# Patient Record
Sex: MALE | Race: WHITE | Employment: UNEMPLOYED | URBAN - METROPOLITAN AREA
[De-identification: names, ages, dates, MRNs, and addresses within clinical notes are randomized per-mention and may not be internally consistent; named-entity substitution may affect disease eponyms.]

---

## 2021-07-10 ENCOUNTER — APPOINTMENT (OUTPATIENT)
Dept: GENERAL RADIOLOGY | Age: 36
End: 2021-07-10
Attending: PHYSICIAN ASSISTANT
Payer: COMMERCIAL

## 2021-07-10 ENCOUNTER — HOSPITAL ENCOUNTER (EMERGENCY)
Age: 36
Discharge: HOME | End: 2021-07-10
Attending: EMERGENCY MEDICINE
Payer: COMMERCIAL

## 2021-07-10 VITALS
TEMPERATURE: 98.5 F | SYSTOLIC BLOOD PRESSURE: 144 MMHG | RESPIRATION RATE: 13 BRPM | HEIGHT: 77 IN | HEART RATE: 99 BPM | DIASTOLIC BLOOD PRESSURE: 101 MMHG | OXYGEN SATURATION: 100 % | BODY MASS INDEX: 27.75 KG/M2 | WEIGHT: 235 LBS

## 2021-07-10 DIAGNOSIS — R07.9 CHEST PAIN, UNSPECIFIED TYPE: Primary | ICD-10-CM

## 2021-07-10 DIAGNOSIS — K44.9 HIATAL HERNIA: ICD-10-CM

## 2021-07-10 LAB
ALBUMIN SERPL-MCNC: 4.1 G/DL (ref 3.5–5)
ALBUMIN/GLOB SERPL: 1.2 {RATIO} (ref 1.1–2.2)
ALP SERPL-CCNC: 82 U/L (ref 45–117)
ALT SERPL-CCNC: 49 U/L (ref 12–78)
ANION GAP SERPL CALC-SCNC: 5 MMOL/L (ref 5–15)
AST SERPL-CCNC: 24 U/L (ref 15–37)
BASOPHILS # BLD: 0 K/UL (ref 0–0.1)
BASOPHILS NFR BLD: 0 % (ref 0–1)
BILIRUB SERPL-MCNC: 0.5 MG/DL (ref 0.2–1)
BUN SERPL-MCNC: 18 MG/DL (ref 6–20)
BUN/CREAT SERPL: 19 (ref 12–20)
CALCIUM SERPL-MCNC: 9.1 MG/DL (ref 8.5–10.1)
CHLORIDE SERPL-SCNC: 108 MMOL/L (ref 97–108)
CK MB CFR SERPL CALC: NORMAL % (ref 0–2.5)
CK MB SERPL-MCNC: <1 NG/ML (ref 5–25)
CK SERPL-CCNC: 154 U/L (ref 39–308)
CO2 SERPL-SCNC: 27 MMOL/L (ref 21–32)
CREAT SERPL-MCNC: 0.95 MG/DL (ref 0.7–1.3)
D DIMER PPP FEU-MCNC: <0.19 MG/L FEU (ref 0–0.65)
DIFFERENTIAL METHOD BLD: NORMAL
EOSINOPHIL # BLD: 0.1 K/UL (ref 0–0.4)
EOSINOPHIL NFR BLD: 2 % (ref 0–7)
ERYTHROCYTE [DISTWIDTH] IN BLOOD BY AUTOMATED COUNT: 11.9 % (ref 11.5–14.5)
GLOBULIN SER CALC-MCNC: 3.5 G/DL (ref 2–4)
GLUCOSE SERPL-MCNC: 89 MG/DL (ref 65–100)
HCT VFR BLD AUTO: 42.9 % (ref 36.6–50.3)
HGB BLD-MCNC: 14.8 G/DL (ref 12.1–17)
IMM GRANULOCYTES # BLD AUTO: 0 K/UL (ref 0–0.04)
IMM GRANULOCYTES NFR BLD AUTO: 0 % (ref 0–0.5)
LYMPHOCYTES # BLD: 1.5 K/UL (ref 0.8–3.5)
LYMPHOCYTES NFR BLD: 20 % (ref 12–49)
MCH RBC QN AUTO: 29.8 PG (ref 26–34)
MCHC RBC AUTO-ENTMCNC: 34.5 G/DL (ref 30–36.5)
MCV RBC AUTO: 86.5 FL (ref 80–99)
MONOCYTES # BLD: 0.4 K/UL (ref 0–1)
MONOCYTES NFR BLD: 5 % (ref 5–13)
NEUTS SEG # BLD: 5.4 K/UL (ref 1.8–8)
NEUTS SEG NFR BLD: 73 % (ref 32–75)
NRBC # BLD: 0 K/UL (ref 0–0.01)
NRBC BLD-RTO: 0 PER 100 WBC
PLATELET # BLD AUTO: 197 K/UL (ref 150–400)
PMV BLD AUTO: 10.5 FL (ref 8.9–12.9)
POTASSIUM SERPL-SCNC: 3.8 MMOL/L (ref 3.5–5.1)
PROT SERPL-MCNC: 7.6 G/DL (ref 6.4–8.2)
RBC # BLD AUTO: 4.96 M/UL (ref 4.1–5.7)
SODIUM SERPL-SCNC: 140 MMOL/L (ref 136–145)
TROPONIN I BLD-MCNC: <0.04 NG/ML (ref 0–0.08)
WBC # BLD AUTO: 7.4 K/UL (ref 4.1–11.1)

## 2021-07-10 PROCEDURE — 74011250637 HC RX REV CODE- 250/637: Performed by: EMERGENCY MEDICINE

## 2021-07-10 PROCEDURE — 80053 COMPREHEN METABOLIC PANEL: CPT

## 2021-07-10 PROCEDURE — 74011000250 HC RX REV CODE- 250: Performed by: EMERGENCY MEDICINE

## 2021-07-10 PROCEDURE — 82550 ASSAY OF CK (CPK): CPT

## 2021-07-10 PROCEDURE — 71045 X-RAY EXAM CHEST 1 VIEW: CPT

## 2021-07-10 PROCEDURE — 36415 COLL VENOUS BLD VENIPUNCTURE: CPT

## 2021-07-10 PROCEDURE — 84484 ASSAY OF TROPONIN QUANT: CPT

## 2021-07-10 PROCEDURE — 85379 FIBRIN DEGRADATION QUANT: CPT

## 2021-07-10 PROCEDURE — 85025 COMPLETE CBC W/AUTO DIFF WBC: CPT

## 2021-07-10 PROCEDURE — 99284 EMERGENCY DEPT VISIT MOD MDM: CPT

## 2021-07-10 PROCEDURE — 93005 ELECTROCARDIOGRAM TRACING: CPT

## 2021-07-10 RX ORDER — SUCRALFATE 1 G/1
1 TABLET ORAL 3 TIMES DAILY
Qty: 30 TABLET | Refills: 0 | Status: SHIPPED | OUTPATIENT
Start: 2021-07-10

## 2021-07-10 RX ADMIN — ALUMINUM HYDROXIDE AND MAGNESIUM HYDROXIDE 40 ML: 200; 200 SUSPENSION ORAL at 21:22

## 2021-07-11 LAB
ATRIAL RATE: 86 BPM
CALCULATED P AXIS, ECG09: 58 DEGREES
CALCULATED R AXIS, ECG10: 86 DEGREES
CALCULATED T AXIS, ECG11: 47 DEGREES
DIAGNOSIS, 93000: NORMAL
P-R INTERVAL, ECG05: 176 MS
Q-T INTERVAL, ECG07: 366 MS
QRS DURATION, ECG06: 102 MS
QTC CALCULATION (BEZET), ECG08: 437 MS
VENTRICULAR RATE, ECG03: 86 BPM

## 2021-07-11 NOTE — ED NOTES
Report received from [facility]. They advised of the patient's chief complaint, current status, orders completed (to include IV access/medications/radiology testing), outstanding orders that still need to be completed, and the treatment plan. Questions asked and answered prior to assumption of care.

## 2021-07-11 NOTE — ED PROVIDER NOTES
EMERGENCY DEPARTMENT HISTORY AND PHYSICAL EXAM      Date: 7/10/2021  Patient Name: Priyanka Segovia    Please note that this dictation was completed with [employer], the computer voice recognition software. Quite often unanticipated grammatical, syntax, homophones, and other interpretive errors are inadvertently transcribed by the computer software. Please disregard these errors. Please excuse any errors that have escaped final proofreading. History of Presenting Illness     Chief Complaint   Patient presents with    Chest Pain     Ambulatory into the ED accompanied by EMS - driving 6 hours to [place] when CP started. Took 324 mg ASA pta. No obvious distress noted. History Provided By: Patient     HPI: Priyanka Segovia, 39 y.o. male, presenting the emergency department complaining of chest pain. Patient reports sudden onset of chest pain while driving. Worse with certain movements, associated with some shortness of breath. No radiation, nothing makes it better or worse. He went to a pharmacy and asked the pharmacist what he should do and they called 911. He had 324 of aspirin prior to arrival.  Patient reports feeling better at this time. No prior history of coronary artery disease, no other medical problems. Nothing makes his symptoms better or worse. PCP: None    No current facility-administered medications on file prior to encounter. No current outpatient medications on file prior to encounter. Past History     Past Medical History:  No past medical history on file. Past Surgical History:  No past surgical history on file. Family History:  No family history on file. Social History:  Social History     Tobacco Use    Smoking status: Not on file   Substance Use Topics    Alcohol use: Not on file    Drug use: Not on file       Allergies:  No Known Allergies      Review of Systems   Review of Systems   Constitutional: Negative for chills and fever.    HENT: Negative for congestion and sore throat. Eyes: Negative for visual disturbance. Respiratory: Negative for cough and shortness of breath. Cardiovascular: Positive for chest pain. Negative for leg swelling. Gastrointestinal: Negative for abdominal pain, blood in stool, diarrhea and nausea. Endocrine: Negative for polyuria. Genitourinary: Negative for dysuria and testicular pain. Musculoskeletal: Negative for arthralgias, joint swelling and myalgias. Skin: Negative for rash. Allergic/Immunologic: Negative for immunocompromised state. Neurological: Negative for weakness and headaches. Hematological: Does not bruise/bleed easily. Psychiatric/Behavioral: Negative for confusion. Physical Exam   Physical Exam  Vitals and nursing note reviewed. Constitutional:       Appearance: He is well-developed. HENT:      Head: Normocephalic and atraumatic. Eyes:      General:         Right eye: No discharge. Left eye: No discharge. Conjunctiva/sclera: Conjunctivae normal.      Pupils: Pupils are equal, round, and reactive to light. Neck:      Trachea: No tracheal deviation. Cardiovascular:      Rate and Rhythm: Normal rate and regular rhythm. Heart sounds: Normal heart sounds. No murmur heard. Pulmonary:      Effort: Pulmonary effort is normal. No respiratory distress. Breath sounds: Normal breath sounds. No wheezing or rales. Abdominal:      General: Bowel sounds are normal.      Palpations: Abdomen is soft. Tenderness: There is no abdominal tenderness. There is no guarding or rebound. Musculoskeletal:         General: No tenderness or deformity. Normal range of motion. Cervical back: Normal range of motion and neck supple. Skin:     General: Skin is warm and dry. Findings: No erythema or rash. Neurological:      Mental Status: He is alert and oriented to person, place, and time.    Psychiatric:         Behavior: Behavior normal.         Diagnostic Study Results     Labs -     Recent Results (from the past 12 hour(s))   EKG, 12 LEAD, INITIAL    Collection Time: 07/10/21  5:01 PM   Result Value Ref Range    Ventricular Rate 86 BPM    Atrial Rate 86 BPM    P-R Interval 176 ms    QRS Duration 102 ms    Q-T Interval 366 ms    QTC Calculation (Bezet) 437 ms    Calculated P Axis 58 degrees    Calculated R Axis 86 degrees    Calculated T Axis 47 degrees    Diagnosis       Normal sinus rhythm  Normal ECG  No previous ECGs available     CBC WITH AUTOMATED DIFF    Collection Time: 07/10/21  6:08 PM   Result Value Ref Range    WBC 7.4 4.1 - 11.1 K/uL    RBC 4.96 4.10 - 5.70 M/uL    HGB 14.8 12.1 - 17.0 g/dL    HCT 42.9 36.6 - 50.3 %    MCV 86.5 80.0 - 99.0 FL    MCH 29.8 26.0 - 34.0 PG    MCHC 34.5 30.0 - 36.5 g/dL    RDW 11.9 11.5 - 14.5 %    PLATELET 239 457 - 929 K/uL    MPV 10.5 8.9 - 12.9 FL    NRBC 0.0 0  WBC    ABSOLUTE NRBC 0.00 0.00 - 0.01 K/uL    NEUTROPHILS 73 32 - 75 %    LYMPHOCYTES 20 12 - 49 %    MONOCYTES 5 5 - 13 %    EOSINOPHILS 2 0 - 7 %    BASOPHILS 0 0 - 1 %    IMMATURE GRANULOCYTES 0 0.0 - 0.5 %    ABS. NEUTROPHILS 5.4 1.8 - 8.0 K/UL    ABS. LYMPHOCYTES 1.5 0.8 - 3.5 K/UL    ABS. MONOCYTES 0.4 0.0 - 1.0 K/UL    ABS. EOSINOPHILS 0.1 0.0 - 0.4 K/UL    ABS. BASOPHILS 0.0 0.0 - 0.1 K/UL    ABS. IMM. GRANS. 0.0 0.00 - 0.04 K/UL    DF AUTOMATED     METABOLIC PANEL, COMPREHENSIVE    Collection Time: 07/10/21  6:08 PM   Result Value Ref Range    Sodium 140 136 - 145 mmol/L    Potassium 3.8 3.5 - 5.1 mmol/L    Chloride 108 97 - 108 mmol/L    CO2 27 21 - 32 mmol/L    Anion gap 5 5 - 15 mmol/L    Glucose 89 65 - 100 mg/dL    BUN 18 6 - 20 MG/DL    Creatinine 0.95 0.70 - 1.30 MG/DL    BUN/Creatinine ratio 19 12 - 20      GFR est AA >60 >60 ml/min/1.73m2    GFR est non-AA >60 >60 ml/min/1.73m2    Calcium 9.1 8.5 - 10.1 MG/DL    Bilirubin, total 0.5 0.2 - 1.0 MG/DL    ALT (SGPT) 49 12 - 78 U/L    AST (SGOT) 24 15 - 37 U/L    Alk.  phosphatase 82 45 - 117 U/L Protein, total 7.6 6.4 - 8.2 g/dL    Albumin 4.1 3.5 - 5.0 g/dL    Globulin 3.5 2.0 - 4.0 g/dL    A-G Ratio 1.2 1.1 - 2.2     D DIMER    Collection Time: 07/10/21  6:08 PM   Result Value Ref Range    D-dimer <0.19 0.00 - 0.65 mg/L FEU   CK W/ CKMB & INDEX    Collection Time: 07/10/21  6:08 PM   Result Value Ref Range    CK - MB <1.0 <3.6 NG/ML    CK-MB Index Cannot be calculated 0.0 - 2.5       39 - 308 U/L   POC TROPONIN-I    Collection Time: 07/10/21  9:16 PM   Result Value Ref Range    Troponin-I (POC) <0.04 0.00 - 0.08 ng/mL       Radiologic Studies -   XR CHEST SNGL V   Final Result   No acute findings. Hiatal hernia. CT Results  (Last 48 hours)    None        CXR Results  (Last 48 hours)               07/10/21 1803  XR CHEST SNGL V Final result    Impression:  No acute findings. Hiatal hernia. Narrative:  EXAM: XR CHEST SNGL V       INDICATION: Chest Pain       COMPARISON: None. FINDINGS: A portable AP radiograph of the chest was obtained at 17:44 hours. The   patient is on a cardiac monitor. There is a small to moderate hiatal hernia. The   lungs are clear. The cardiac and mediastinal contours and pulmonary vascularity   are normal.  The bones and soft tissues are grossly within normal limits. Medical Decision Making   I am the first provider for this patient. I reviewed the vital signs, available nursing notes, past medical history, past surgical history, family history and social history. Vital Signs-Reviewed the patient's vital signs. Patient Vitals for the past 12 hrs:   Temp Pulse Resp BP SpO2   07/10/21 1658 98.5 °F (36.9 °C) 99 13 (!) 144/101 100 %       EKG interpretation: (Preliminary)  EKG shows sinus rhythm, rate 86. Normal axis. Normal intervals. No evidence of acute ischemic ST-T wave changes. Interpreted by me    Records Reviewed:   Nursing notes, Prior visits     Provider Notes (Medical Decision Making):   Patient presents with CP.   DDx: ACS, Aortic dissection, PNA, PE, PTX, pericarditis, myocarditis, GERD, costochondritis, anxiety. Most likely non-cardiac or pulmonary given the HPI and Physical exam. Will obtain labs, CXR, EKG. - I have re-examined the patient and the patient denies chest pain on re-examination. The patient has had onset of chest pain greater than 8 hours and one negative set of cardiac enzymes or 2 negative sets of cardiac enzymes in the ER during this visit. The diagnosis, follow up, return instructions, test results, x-rays and medications have been discussed and reviewed with the patient. The patient has been given the opportunity to ask questions. The patient  expresses understanding of the diagnosis, follow-up and return instructions. The patient agrees to follow up with primary care or cardiology as directed and to return immediately if the chest pain worsens. The patient expresses understanding that although cardiac testing at this time is negative, a cardiac problem could still be present and that a follow-up appointment for further evaluation and risk factor modification is necessary to complete the evaluation of this complaint. ED Course:   Initial assessment performed. The patients presenting problems have been discussed, and they are in agreement with the care plan formulated and outlined with them. I have encouraged them to ask questions as they arise throughout their visit. Critical Care Time:   none    Disposition:    DISCHARGE NOTE  Patients results have been reviewed with them. Patient and/or family have verbally conveyed their understanding and agreement of the patient's signs, symptoms, diagnosis, treatment and prognosis and additionally agree to follow up as recommended or return to the Emergency Room should their condition change or have any new concerns prior to their follow-up appointment.  Patient verbally agrees with the care-plan and verbally conveys that all of their questions have been answered. Discharge instructions have also been provided to the patient with some educational information regarding their diagnosis as well a list of reasons why they would want to return to the ER prior to their follow-up appointment should their condition change. PLAN:  1. Discharge Medication List as of 7/10/2021  9:19 PM      START taking these medications    Details   sucralfate (Carafate) 1 gram tablet Take 1 Tablet by mouth three (3) times daily. , Print, Disp-30 Tablet, R-0           2. Follow-up Information     Follow up With Specialties Details Why Contact Info    [facility] EMERGENCY DEPT Emergency Medicine  If symptoms worsen [address]  [address] [facility]  [phone]    Your Primary Care doctor  Schedule an appointment as soon as possible for a visit            Return to ED if worse     Diagnosis     Clinical Impression:   1. Chest pain, unspecified type    2. Hiatal hernia        Attestations:   This note was completed by Kalyan Mcdermott DO

## 2023-05-14 RX ORDER — SUCRALFATE 1 G/1
1 TABLET ORAL 3 TIMES DAILY
COMMUNITY
Start: 2021-07-10